# Patient Record
(demographics unavailable — no encounter records)

---

## 2017-07-20 NOTE — EMERGENCY ROOM REPORT
History of Present Illness


General


Chief Complaint:  Lower Extremity Injury


Source:  Patient





Present Illness


HPI


20-year-old female presents ED complaining of left toe pain and swelling.  

States that she stubbed her toe yesterday while moving.  Notes bruising and 

swelling to left small toe.  Pain is a 9 of 10, throbbing, worse with weight-

bearing.  Denies any other injuries.  No other aggravating or relieving 

factors.  Denies any other associated symptoms


Allergies:  


Coded Allergies:  


     No Known Allergies (Unverified , 6/18/15)





Patient History


Past Medical History:  none


Past Surgical History:  none


Pertinent Family History:  none


Social History:  Denies: alcohol use, drug use, smoking


Last Menstrual Period:  1 month


Pregnant Now:  No


Immunizations:  UTD


Reviewed Nursing Documentation:  PMH: Agreed, PSxH: Agreed





Nursing Documentation-PMH


Past Medical History:  No Stated History





Review of Systems


All Other Systems:  negative except mentioned in HPI





Physical Exam





Vital Signs








  Date Time  Temp Pulse Resp B/P Pulse Ox O2 Delivery O2 Flow Rate FiO2


 


7/20/17 16:10 99.0 123 16 118/79 98 Room Air  








Sp02 EP Interpretation:  reviewed, normal


General Appearance:  no apparent distress, alert, GCS 15, non-toxic


Head:  normocephalic


Eyes:  bilateral eye PERRL, bilateral eye normal inspection


ENT:  normal ENT inspection


Neck:  normal inspection


Respiratory:  normal inspection


Cardiovascular #1:  normal inspection


Gastrointestinal:  normal inspection


Rectal:  deferred


Genitourinary:  no CVA tenderness


Musculoskeletal:  tender - L small toe. bruising


Neurologic:  alert, oriented x3, responsive, motor strength/tone normal, 

sensory intact, speech normal


Psychiatric:  normal inspection


Skin:  normal inspection


Lymphatic:  normal inspection





Medical Decision Making


Diagnostic Impression:  


 Primary Impression:  


 Toe contusion


 Qualified Codes:  S90.222A - Contusion of left lesser toe(s) with damage to 

nail, initial encounter


ER Course


Hospital Course 


20-year-old F presents to ED complaining of L toe pain/bruising





Differential diagnoses include: Fracture, dislocation, sprain, contusion





Clinical course


Patient placed on stretcher.  After initial history and physical, I ordered 

pain medications and Xrays of L foot





Xrays prelim read shows no acute fracture/dislocation.  Discussed findings with 

the patient.  Recommend ice, elevation, avoid tight fitting shoes 





Diagnosis - toe contusion





Stable and discharged to home with prescription for Tylenol.  apply ice, keep 

elevated.  weight bear as tolerated.  Followup with PMD.  Return to ED if 

symptoms recur or worsen


Other X-Ray Diagnostic Results


Other X-Ray Diagnostic Results :  


   X-Ray ordered:  L foot


   # of Views/Limited Vs Complete:  3 View


   Indication:  Pain


   EP Interpretation:  Yes


   Interpretation:  no dislocation, no soft tissue swelling, no fractures


   Impression:  No acute disease


   Interpreting ER Provider:  Electronically signed by Raphael Vuong MD





Last Vital Signs








  Date Time  Temp Pulse Resp B/P Pulse Ox O2 Delivery O2 Flow Rate FiO2


 


7/20/17 16:10 99.0 123 16 118/79 98 Room Air  








Status:  improved


Disposition:  HOME, SELF-CARE


Condition:  Stable


Scripts


Acetaminophen* (TYLENOL EXTRA STRENGTH*) 500 Mg Tablet


500 MG ORAL Q8H Y for Prn Headache/Temp > 101, #30 TAB 0 Refills


   Prov: RAPHAEL UVONG M.D.         7/20/17


Patient Instructions:  Foot Contusion











RAPHAEL VUONG M.D. Jul 20, 2017 17:19

## 2017-07-21 NOTE — DIAGNOSTIC IMAGING REPORT
Indication: Pain



Comparison: None



Findings: 3  views of the left foot were obtained.



No acute fractures, malalignment, erosions or periostitis are identified. 

Bone

mineralization is within normal limits. Soft tissues are unremarkable.





Impression: No acute findings
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
## 2018-02-02 NOTE — DIAGNOSTIC IMAGING REPORT
Indication:Lower abdominal and pelvic pain

 

Technique: Grayscale and duplex Doppler imaging of the pelvis performed utilizing a

transabdominal scan and endovaginal scan.

 

Comparison: None

 

Findings:

 

1.4 cm hypoechoic lesion noted within the left ovary probably hemorrhagic cyst. Some

follicles demonstrated. There is good dopplerable blood flow within both ovaries. The

uterus demonstrates multiple masses consistent with fibroids ranging in size between

1.5 and 2.5 cm.. Endometrium is 9 to 10 mm in thickness. The uterus measures 7 x 5 x

4 cm. Cervical nabothian cysts are noted. Left ovary 4.5 x 3.7 x 1.6 cm. Right ovary

4.2 x 3.1 x 2.5 cm.

 

IMPRESSION:

 

Multiple uterine fibroids

 

1.4 cm hemorrhagic cyst suspected in the left ovary. Follow-up recommended in 6

weeks.

## 2018-02-02 NOTE — DIAGNOSTIC IMAGING REPORT
Indication: Abdominal pain

 

Technique: Continuous helical transaxial imaging of the abdomen and pelvis was

obtained from the lung bases to the pubic symphysis during intravenous contrast

administration. Coronal 2-D reformats were also obtained. Study obtained in a Siemens

sensation 64 slice CT.  Automatic Exposure Control was utilized.

 

Total Dose length Product (DLP):  640.92 mGycm

 

CT Dose Index Volume (CTDIvol):   11.86 mGy

 

Comparison: None

 

Findings: The lung bases are clear. Solid organs are unremarkable. The gallbladder is

unremarkable. There are mildly distended fluid-filled loops of small bowel diffusely

throughout the abdomen without definite transition. The findings probably represent

enteritis or ileus. Please correlate clinically. The appendix is partially visualized

and as such appears normal. There is no abscess or free fluid. The bladder is

unremarkable. Uterus noted. No adnexal mass seen.

 

IMPRESSION:

 

Suspected ileus/enteritis. Please correlate clinically.

 

 

 

The CT scanner at [facility] is accredited by the American College of

Radiology and the scans are performed using dose optimization techniques as

appropriate to a performed exam including Automatic Exposure control.

## 2018-02-03 NOTE — EMERGENCY ROOM REPORT
History of Present Illness


General


Chief Complaint:  Abdominal Pain


Source:  Patient





Present Illness


HPI


Patient reports that she was essentially active several days ago she took a day 

after pill as there was some questionable contact was semen


Patient was having some lower abdominal cramping


Denies any vomiting or diarrhea


Denies any chest pressures of breath





Denies any dysuria patient was warned about possible pregnancy


Denies any pelvic pain denies any vaginal discharge


Denies any fevers or chills


Allergies:  


Coded Allergies:  


     No Known Allergies (Unverified , 6/18/15)





Patient History


Past Medical History:  see triage record


Pertinent Family History:  none


Last Menstrual Period:  01/24/18


Reviewed Nursing Documentation:  PMH: Agreed, PSxH: Agreed





Nursing Documentation-PMH


Past Medical History:  No Stated History





Review of Systems


All Other Systems:  negative except mentioned in HPI





Physical Exam





Vital Signs








  Date Time  Temp Pulse Resp B/P (MAP) Pulse Ox O2 Delivery O2 Flow Rate FiO2


 


2/2/18 09:56 97.9 96 22 113/82 99 Room Air  








Sp02 EP Interpretation:  reviewed, normal


General Appearance:  well appearing, no apparent distress


Head:  normocephalic, atraumatic


Eyes:  bilateral eye PERRL, bilateral eye EOMI


ENT:  hearing grossly normal, normal pharynx, TMs + canals normal, uvula midline


Neck:  full range of motion, supple, no meningismus, no bony tend


Respiratory:  lungs clear, normal breath sounds, no rhonchi, no respiratory 

distress, no retraction, no accessory muscle use


Cardiovascular #1:  normal peripheral pulses, regular rate, rhythm, no edema, 

no gallop, no JVD, no murmur


Gastrointestinal:  normal bowel sounds, non tender, soft, no mass, no 

organomegaly, non-distended, no guarding, no hernia, no pulsatile mass, no 

rebound


Genitourinary:  no CVA tenderness


Musculoskeletal:  normal inspection


Neurologic:  oriented x3, responsive, CNs III-XII nml as tested, motor strength/

tone normal, sensory intact


Psychiatric:  mood/affect normal


Skin:  normal color, no rash, warm/dry, palpation normal


Lymphatic:  normal inspection, no adenopathy





Medical Decision Making


Diagnostic Impression:  


 Primary Impression:  


 abdominal pain


 Additional Impressions:  


 ovarian cyst


 uterine fibroids


ER Course


With the patient's history and examination, multiple differentials considered, 

including but not limited to pregnancy, ectopic pregnancy, ovarian torsion, 

gastritis, cholecystitis, pancreatitis, appendicitis





Patient's ultrasound shows likely hemorrhagic cyst


Pregnancy test was negative





Consideration for PID is low given the patient's lack of fever or vaginal 

discharge


Patient also shows signs of uterine fibroids


And understands the need for close gynecology followup mom is at bedside I did 

discuss this with her as well





Labs








Test


  2/2/18


10:30


 


White Blood Count


  11.8 K/UL


(4.8-10.8)


 


Red Blood Count


  4.12 M/UL


(4.20-5.40)


 


Hemoglobin


  13.2 G/DL


(12.0-16.0)


 


Hematocrit


  40.0 %


(37.0-47.0)


 


Mean Corpuscular Volume 97 FL (80-99) 


 


Mean Corpuscular Hemoglobin


  32.1 PG


(27.0-31.0)


 


Mean Corpuscular Hemoglobin


Concent 33.1 G/DL


(32.0-36.0)


 


Red Cell Distribution Width


  12.2 %


(11.6-14.8)


 


Platelet Count


  334 K/UL


(150-450)


 


Mean Platelet Volume


  7.1 FL


(6.5-10.1)


 


Neutrophils (%) (Auto)


  80.4 %


(45.0-75.0)


 


Lymphocytes (%) (Auto)


  7.0 %


(20.0-45.0)


 


Monocytes (%) (Auto)


  11.2 %


(1.0-10.0)


 


Eosinophils (%) (Auto)


  0.8 %


(0.0-3.0)


 


Basophils (%) (Auto)


  0.6 %


(0.0-2.0)


 


Urine Color Yellow 


 


Urine Appearance Clear 


 


Urine pH 6 (4.5-8.0) 


 


Urine Specific Gravity


  1.020


(1.005-1.035)


 


Urine Protein 1+ (NEGATIVE) 


 


Urine Glucose (UA)


  Negative


(NEGATIVE)


 


Urine Ketones


  Negative


(NEGATIVE)


 


Urine Occult Blood


  Negative


(NEGATIVE)


 


Urine Nitrite


  Negative


(NEGATIVE)


 


Urine Bilirubin


  Negative


(NEGATIVE)


 


Urine Urobilinogen


  Normal MG/DL


(0.0-1.0)


 


Urine Leukocyte Esterase 2+ (NEGATIVE) 


 


Urine RBC


  0-2 /HPF (0 -


2)


 


Urine WBC


  15-20 /HPF (0


- 2)


 


Urine Squamous Epithelial


Cells Many /LPF


(NONE/OCC)


 


Urine Bacteria


  Few /HPF


(NONE)


 


Sodium Level


  140 MMOL/L


(136-145)


 


Potassium Level


  4.1 MMOL/L


(3.5-5.1)


 


Chloride Level


  105 MMOL/L


()


 


Carbon Dioxide Level


  26 MMOL/L


(21-32)


 


Anion Gap


  10 mmol/L


(5-15)


 


Blood Urea Nitrogen 9 mg/dL (7-18) 


 


Creatinine


  0.9 MG/DL


(0.55-1.30)


 


Estimat Glomerular Filtration


Rate > 60 mL/min


(>60)


 


Glucose Level


  85 MG/DL


()


 


Calcium Level


  9.1 MG/DL


(8.5-10.1)


 


Total Bilirubin


  0.2 MG/DL


(0.2-1.0)


 


Aspartate Amino Transf


(AST/SGOT) 25 U/L (15-37) 


 


 


Alanine Aminotransferase


(ALT/SGPT) 28 U/L (12-78) 


 


 


Alkaline Phosphatase


  64 U/L


()


 


Total Protein


  8.2 G/DL


(6.4-8.2)


 


Albumin


  3.2 G/DL


(3.4-5.0)


 


Globulin 5.0 g/dL 


 


Albumin/Globulin Ratio 0.6 (1.0-2.7) 


 


Lipase


  129 U/L


()


 


Human Chorionic Gonadotropin,


Quant 1 mIU/mL (1-6) 


 








CT/MRI/US Diagnostic Results


CT/MRI/US Diagnostic Results :  


   Impression


pelvic ultrasoundIMPRESSION:


 


Multiple uterine fibroids


 


1.4 cm hemorrhagic cyst suspected in the left ovary. Follow-up recommended in 6


weeks.





abdominal CTIMPRESSION:


 


Suspected ileus/enteritis. Please correlate clinically.





Last Vital Signs








  Date Time  Temp Pulse Resp B/P (MAP) Pulse Ox O2 Delivery O2 Flow Rate FiO2


 


2/2/18 14:43  77 14 115/75 100 Room Air  


 


2/2/18 09:56 97.9       








Status:  improved


Disposition:  HOME, SELF-CARE


Condition:  Improved


Scripts


Triamcinolone Acetonide (TRIAMCINOLONE ACETONIDE) 80 Gm Oint...g.


80 GM TP BID for 7 Days, GM


   Prov: NILA CALDERÓN D.O.         2/2/18 


Acetaminophen With Codeine (T#3) (TYLENOL #3 TAB*) Y Tab


1 TAB ORAL Q8H Y for For Pain, #7 TAB


   Prov: NILA CALDERÓN D.O.         2/2/18 


Trimethoprim/Sulfamethoxazole 160/800* (BACTRIM DS TABLET*) 1 Each Tablet


1 TAB ORAL Q12H, #14 TAB 0 Refills


   Prov: NILA CALDERÓN D.O.         2/2/18 


Ibuprofen* (MOTRIN*) 600 Mg Tablet


600 MG ORAL Q8H Y for For Pain, #20 TAB 0 Refills


   Prov: NILA CALDERÓN D.O.         2/2/18


Referrals:  


PIEDAD MCNAMARA [facility] (PCP)


Patient Instructions:  Uterine Fibroids, Easy-to-Read, Ovarian Cyst, Easy-to-

Read, Urinary Tract Infection, Easy-to-Read, Abdominal Pain, Adult





Additional Instructions:  


Patient is provided with the discharge instructions notified to follow up with 

primary doctor in the next 2-3 days otherwise return to the er with any 

worsening symptoms.


Please note that this report is being documented using DRAGON technology.  This 

can lead to erroneous entry secondary to incorrect interpretation by the 

dictating instrument.











NILA CALDERÓN D.O. Feb 3, 2018 15:07

## 2018-02-08 NOTE — EMERGENCY ROOM REPORT
History of Present Illness


General


Chief Complaint:  Abdominal Pain


Source:  Patient, Family Member





Present Illness


HPI


Is a 21-year-old female with no past medical history patient present with chief 

complaint abdominal pain with vomiting.  Onset for about a month now.  Was seen 

here a few days ago.  Labs unremarkable.  CT scan showed enteritis.  Patient 

still with vomiting unable to tolerate the Tylenol with codeine and Cipro.  No 

fever chills vomiting is on and off.  Pain is upper quadrant area.  No 

radiation.  No longer having diarrhea.  Family history of inflammatory bowel 

disease.


Allergies:  


Coded Allergies:  


     No Known Allergies (Unverified , 6/18/15)





Patient History


Past Medical History:  see triage record, old chart reviewed


Past Surgical History:  none


Pertinent Family History:  none


Social History:  Denies: smoking


Last Menstrual Period:  jan 19


Pregnant Now:  No


Immunizations:  other


Reviewed Nursing Documentation:  PMH: Agreed, PSxH: Agreed





Nursing Documentation-PMH


Past Medical History:  No Stated History





Review of Systems


Eye:  Denies: eye pain, blurred vision


ENT:  Denies: ear pain, nose congestion, throat swelling


Respiratory:  Denies: cough, shortness of breath


Cardiovascular:  Denies: chest pain, palpitations


Gastrointestinal:  Reports: abdominal pain, nausea, vomiting, Denies: diarrhea


Musculoskeletal:  Denies: back pain, joint pain


Skin:  Denies: rash


Neurological:  Denies: headache, numbness


Endocrine:  Denies: increased thirst, increased urine


Hematologic/Lymphatic:  Denies: easy bruising


All Other Systems:  negative except mentioned in HPI





Physical Exam





Vital Signs








  Date Time  Temp Pulse Resp B/P (MAP) Pulse Ox O2 Delivery O2 Flow Rate FiO2


 


2/7/18 22:06 98.2 86 18 109/69 98   


 


2/7/18 22:11      Room Air  





vitals normal


Sp02 EP Interpretation:  reviewed, normal


General Appearance:  well appearing, no apparent distress, alert


Head:  normocephalic, atraumatic


Eyes:  bilateral eye PERRL, bilateral eye EOMI


ENT:  hearing grossly normal, normal pharynx


Neck:  full range of motion, supple, no meningismus


Respiratory:  chest non-tender, lungs clear, normal breath sounds


Cardiovascular #1:  regular rate, rhythm, no murmur


Gastrointestinal:  normal bowel sounds, non tender, no mass, no organomegaly, 

no bruit, non-distended


Musculoskeletal:  back normal, gait/station normal, normal range of motion


Psychiatric:  mood/affect normal


Skin:  warm/dry





Medical Decision Making


Diagnostic Impression:  


 Primary Impression:  


 Abdominal pain


 Qualified Codes:  R10.10 - Upper abdominal pain, unspecified


 Additional Impressions:  


 Vomiting


 Qualified Codes:  R11.2 - Nausea with vomiting, unspecified


 Enteritis


ER Course


Patient with abdominal pain and CT scan show enteritis 2 days ago.  Onset 

however been almost a month.  Worse him for other inflammatory bowel disease.  

Patient is otherwise stable.  No perforation.  No acute abdomen.  No 

obstruction.  We'll discharge home.


Lab Results Impression


labs unremarkable





Last Vital Signs








  Date Time  Temp Pulse Resp B/P (MAP) Pulse Ox O2 Delivery O2 Flow Rate FiO2


 


2/7/18 22:11 98.2 86 18 109/69 98 Room Air  








Status:  improved


Disposition:  HOME, SELF-CARE


Condition:  Stable


Scripts


Ondansetron Odt* (ZOFRAN ODT*) 4 Mg Tab.rapdis


4 MG ORAL Q6H Y for Nausea & Vomiting, #30 TAB 0 Refills


   Prov: ALLI COLÓN M.D.         2/8/18 


Hydrocodone/Acetaminophen 5-325* (HYDROCODONE/ACETAMINOPHEN 5-325*) 1 Each 

Tablet


1 TAB ORAL Q6H Y for For Pain, #15 TAB 0 Refills


   Prov: ALLI COLÓN M.D.         2/8/18 


Amoxicillin/Potassium Clav 875-125* (AUGMENTIN 875-125 TABLET*) 1 Each Tablet


1 TAB ORAL TWICE A DAY, #14 TAB


   Prov: ALLI COLÓN M.D.         2/8/18


Patient Instructions:  Abdominal Pain, Adult





Additional Instructions:  


Followup with your  within a week.  He will be referred to see a 

gastroenterologist.  Return if worse.











ALLI COLÓN M.D. Feb 8, 2018 00:30

## 2018-06-18 NOTE — EMERGENCY ROOM REPORT
History of Present Illness


General


Chief Complaint:  Headache


Source:  Patient





Present Illness


HPI


21-year-old female presents emergency department complaining of 8 out of 10 in 

severity persistent headache that was progressive onset 3 days.  Patient 

denies no diagnosed hx  of migraines however she states she has had similar 

headaches in the past.  Patient also reports some activity to light and loud 

noises.  Reports nausea but denies vomiting.  Denies recent head injury, recent 

illness denies fevers, chills, abdominal pain/tenderness, constipation or 

diarrhea.  Patient states she had a UTI in January for which she took 

antibiotics and her symptoms resolved.  Patient denies pregnancy and states she 

had her menstrual cycle one week ago.  She reports history of ovarian cysts. 

Denies sudden onset, neck pain or stiffness denies paresthesias or unilateral 

weaknesses. Denies dizziness or visual changes.


Allergies:  


Coded Allergies:  


     No Known Allergies (Unverified , 6/18/15)





Patient History


Past Medical History:  see triage record


Past Surgical History:  none


Pertinent Family History:  none


Last Menstrual Period:  June 6,2018


Pregnant Now:  No


Reviewed Nursing Documentation:  PMH: Agreed; PSxH: Agreed





Nursing Documentation-PMH


Past Medical History:  No Stated History





Review of Systems


All Other Systems:  negative except mentioned in HPI





Physical Exam





Vital Signs








  Date Time  Temp Pulse Resp B/P (MAP) Pulse Ox O2 Delivery O2 Flow Rate FiO2


 


6/18/18 19:26 98.7 81 16 134/91 99 Room Air  





 98.8       








Sp02 EP Interpretation:  reviewed, normal


General Appearance:  no apparent distress, alert, GCS 15, non-toxic


Head:  normocephalic, atraumatic


Eyes:  bilateral eye normal inspection, bilateral eye PERRL


ENT:  hearing grossly normal, normal voice


Neck:  full range of motion, no meningismus, no bony tend


Respiratory:  chest non-tender, lungs clear, normal breath sounds, speaking 

full sentences


Cardiovascular #1:  regular rate, rhythm


Gastrointestinal:  normal bowel sounds, non tender, soft


Genitourinary:  normal inspection, no CVA tenderness


Musculoskeletal:  back normal, gait/station normal, normal range of motion, non-

tender


Neurologic:  alert, oriented x3, responsive, motor strength/tone normal, 

sensory intact, normal gait, speech normal, grossly normal


Psychiatric:  judgement/insight normal


Skin:  normal color, no rash, warm/dry, well hydrated





Medical Decision Making


PA Attestation


Dr. Chavez is my supervising Physician whom patient management has been 

discussed with.


Diagnostic Impression:  


 Primary Impression:  


 Headache


 Qualified Codes:  R51 - Headache


ER Course


21-year-old female presents emergency department complaining of 8 out of 10 in 

severity persistent headache that was progressive onset 3 days.  Patient 

denies no diagnosed hx  of migraines however she states she has had similar 

headaches in the past.  Patient also reports some activity to light and loud 

noises.  Reports nausea but denies vomiting.  Denies recent head injury, recent 

illness denies fevers, chills, abdominal pain/tenderness, constipation or 

diarrhea.  Patient states she had a UTI in January for which she took 

antibiotics and her symptoms resolved.  Patient denies pregnancy and states she 

had her menstrual cycle one week ago.  She reports history of ovarian cysts. 

Denies sudden onset, neck pain or stiffness denies paresthesias or unilateral 

weaknesses. Denies dizziness or visual changes. 





Ddx considered but are not limited to migraine, SAH, Pseudomotor Cerebri,, Mass 

lesion, Cluster HA, Tension HA, Post lumbar puncture HA.





Vital signs: are WNL, pt. is afebrile


H&PE are most consistent with migraine headache- No focal neurological deficits

, neuronal logical exam is normal.  no meningismus





ORDERS: 


- none required at this time, dx is clinical. 





ED INTERVENTIONS: 


- Compazine PO


-IM Toradol


- Re-assessment: Pt. reports moderate improvement in her symptoms. She states 

that her symptoms are almost completely resolved after ED interventions. 





I discussed w.  the patient that she needs to follow-up with primary care and 

preferably obtain Neurology referral for definitive diagnosis of type of 

headaches. I  Gave patient strict ED return precautions, and encouraged her to 

return with worsening or new symptoms.





DISCHARGE: At this time pt. is stable for d/c to home. Will provide printed 

patient care instructions, and any necessary prescriptions. Care plan and 

follow up instructions have been discussed with the patient prior to discharge.





Labs








Test


  6/18/18


19:40


 


Urine Color Yellow 


 


Urine Appearance Clear 


 


Urine pH 6 (4.5-8.0) 


 


Urine Specific Gravity


  1.020


(1.005-1.035)


 


Urine Protein


  Negative


(NEGATIVE)


 


Urine Glucose (UA)


  Negative


(NEGATIVE)


 


Urine Ketones 1+ (NEGATIVE) 


 


Urine Occult Blood


  Negative


(NEGATIVE)


 


Urine Nitrite


  Negative


(NEGATIVE)


 


Urine Bilirubin


  Negative


(NEGATIVE)


 


Urine Urobilinogen


  1 MG/DL


(0.0-1.0)


 


Urine Leukocyte Esterase 1+ (NEGATIVE) 


 


Urine RBC


  0-2 /HPF (0 -


2)


 


Urine WBC


  2-4 /HPF (0 -


2)


 


Urine Squamous Epithelial


Cells Few /LPF


(NONE/OCC)


 


Urine Bacteria


  Few /HPF


(NONE)


 


Urine HCG, Qualitative


  Negative


(NEGATIVE)











Last Vital Signs








  Date Time  Temp Pulse Resp B/P (MAP) Pulse Ox O2 Delivery O2 Flow Rate FiO2


 


6/18/18 19:26 98.7 81 16 134/91 99 Room Air  





 98.8       








Disposition:  HOME, SELF-CARE


Condition:  Stable


Patient Instructions:  General Headache Without Cause, Migraine Headache





Additional Instructions:  


Take medications as directed. 





 ** Follow up with a Primary Care Provider in 3-5 days for Neurology Referral/ 

evaluation, even if your symptoms have resolved. ** 


--Please review list of primary care clinics, if you do not already have a 

primary care provider





Return sooner to ED if new symptoms occur, or current symptoms become worse. 











- Please note that this Emergency Department Report was dictated using [employer] technology software, occasionally this can lead to 

erroneous entry secondary to interpretation by the dictation equipment.











Nitza Macias Jun 18, 2018 19:56